# Patient Record
Sex: MALE | Race: ASIAN | NOT HISPANIC OR LATINO | URBAN - METROPOLITAN AREA
[De-identification: names, ages, dates, MRNs, and addresses within clinical notes are randomized per-mention and may not be internally consistent; named-entity substitution may affect disease eponyms.]

---

## 2025-03-08 ENCOUNTER — OFFICE VISIT (OUTPATIENT)
Dept: URGENT CARE | Age: 20
End: 2025-03-08
Payer: COMMERCIAL

## 2025-03-08 VITALS
WEIGHT: 120 LBS | OXYGEN SATURATION: 98 % | RESPIRATION RATE: 16 BRPM | SYSTOLIC BLOOD PRESSURE: 123 MMHG | TEMPERATURE: 99 F | HEART RATE: 111 BPM | DIASTOLIC BLOOD PRESSURE: 72 MMHG | BODY MASS INDEX: 17.77 KG/M2 | HEIGHT: 69 IN

## 2025-03-08 DIAGNOSIS — Z71.1 CONCERN ABOUT STD IN MALE WITHOUT DIAGNOSIS: Primary | ICD-10-CM

## 2025-03-08 ASSESSMENT — PAIN SCALES - GENERAL: PAINLEVEL_OUTOF10: 0-NO PAIN

## 2025-03-08 NOTE — PATIENT INSTRUCTIONS
Please follow up with results on your MyChart. You will be called with any positive results and called in antibiotics if necessary. If there is any concern for HIV, hepatitis or syphilis blood testing can be obtained at your Emergency Department.

## 2025-03-08 NOTE — PROGRESS NOTES
"Subjective   Patient ID: Ranulfo Gonzáles is a 20 y.o. male. They present today with a chief complaint of Exposure to STD (Patient c/o possible expospure, on thursaday, itchiness, soreness ).    History of Present Illness  Patient is a 20-year-old male who presents for concern of STD.  States that he may have had a possible exposure.  Notes itchiness and soreness to the anal region.  Denies any urethral discharge, dysuria, hematuria, rashes, testicular pain or abdominal pain.    Past Medical History  Allergies as of 03/08/2025    (No Known Allergies)       (Not in a hospital admission)       No past medical history on file.    No past surgical history on file.     reports that he has never smoked. He has never used smokeless tobacco. He reports that he does not drink alcohol and does not use drugs.    Review of Systems  ROS is negative unless otherwise stated in HPI.         Objective    Vitals:    03/08/25 1408   BP: 123/72   Pulse: (!) 111   Resp: 16   Temp: 37.2 °C (99 °F)   SpO2: 98%   Weight: 54.4 kg (120 lb)   Height: 1.753 m (5' 9\")     No LMP for male patient.      VS: As documented in the triage note and EMR flowsheet from this visit was reviewed  General: Well appearing. No acute distress.   Eyes:  Extraocular movements grossly intact. No scleral icterus.   Head: Atraumatic. Normocephalic.     Neck: No meningismus. No gross masses. Full movement through range of motion  CV: Regular rhythm. No murmurs, rubs, gallops appreciated.   Resp: Clear to auscultation bilaterally. No respiratory distress.    GI: Nontender. Soft. No masses. No rebound, rigidity or guarding.   MSK: Symmetric muscle bulk. No gross step offs or deformities.  Skin: Warm, dry. No rashes  Neuro: CN II-VII intact. A&O x3. Speech fluent. Alert. Moving all extremities. Ambulates with normal gait  Psych: Appropriate mood and affect for situation      Point of Care Test & Imaging Results from this visit  No results found for this visit on " 03/08/25.   No results found.    Diagnostic study results (if any) were reviewed by Johanny Edwards PA-C.    Assessment/Plan   Allergies, medications, history, and pertinent labs/EKGs/Imaging reviewed by Johanny Edwards PA-C.     Medical Decision Making  Patient is a 20-year-old male who presents for concern of STD.  Endorsing pruritis and soreness to the anal region.  He denies any urethral or testicular symptoms.  Denies any abdominal pain or rashes.  Obtained swabs of the anal region as well as a urine.  Patient advised to follow along with results on MyChart.  He will be called with any positive results and called in antibiotics if necessary.  He was advised that if there is any concern for HIV, hepatitis or syphilis that he go to the emergency department or follow up with primary care to have these labs drawn.  Advised on conservative management for discomfort.    Orders and Diagnoses  Diagnoses and all orders for this visit:  Concern about STD in male without diagnosis  -     C. trachomatis / N. gonorrhoeae, Amplified, Urogenital; Future  -     C. trachomatis / N. gonorrhoeae, Amplified, Urogenital  -     Trichomonas vaginalis, Amplified      Medical Admin Record      Patient disposition: Home    Electronically signed by Johanny Edwards PA-C  2:35 PM

## 2025-03-10 LAB
C TRACH RRNA SPEC QL NAA+PROBE: NOT DETECTED
C TRACH RRNA SPEC QL NAA+PROBE: NOT DETECTED
N GONORRHOEA RRNA SPEC QL NAA+PROBE: DETECTED
N GONORRHOEA RRNA SPEC QL NAA+PROBE: NOT DETECTED
QUEST GC CT AMPLIFIED (ALWAYS MESSAGE): ABNORMAL
QUEST GC CT AMPLIFIED (ALWAYS MESSAGE): NORMAL
T VAGINALIS RRNA SPEC QL NAA+PROBE: NOT DETECTED

## 2025-03-19 ENCOUNTER — TELEPHONE (OUTPATIENT)
Dept: URGENT CARE | Age: 20
End: 2025-03-19

## 2025-03-19 NOTE — TELEPHONE ENCOUNTER
Left VM for patient to call the clinic back to discuss lab results and coming back in for treatment. Advised patient to call back at [phone]. Patient has seen his results on my chart. Patient positive for gonorrhea.

## 2025-03-27 ENCOUNTER — TELEPHONE (OUTPATIENT)
Dept: URGENT CARE | Age: 20
End: 2025-03-27